# Patient Record
Sex: MALE | Race: WHITE | URBAN - METROPOLITAN AREA
[De-identification: names, ages, dates, MRNs, and addresses within clinical notes are randomized per-mention and may not be internally consistent; named-entity substitution may affect disease eponyms.]

---

## 2024-09-11 ENCOUNTER — OFFICE VISIT (OUTPATIENT)
Dept: FAMILY MEDICINE CLINIC | Facility: CLINIC | Age: 29
End: 2024-09-11

## 2024-09-11 VITALS
WEIGHT: 167.25 LBS | SYSTOLIC BLOOD PRESSURE: 131 MMHG | HEART RATE: 69 BPM | DIASTOLIC BLOOD PRESSURE: 80 MMHG | BODY MASS INDEX: 29.63 KG/M2 | HEIGHT: 63 IN

## 2024-09-11 DIAGNOSIS — R42 DIZZINESS: ICD-10-CM

## 2024-09-11 DIAGNOSIS — Z23 ENCOUNTER FOR ADMINISTRATION OF VACCINE: ICD-10-CM

## 2024-09-11 DIAGNOSIS — Z00.00 WELL ADULT EXAM: Primary | ICD-10-CM

## 2024-09-11 PROCEDURE — 99385 PREV VISIT NEW AGE 18-39: CPT | Performed by: NURSE PRACTITIONER

## 2024-09-11 PROCEDURE — 90471 IMMUNIZATION ADMIN: CPT | Performed by: NURSE PRACTITIONER

## 2024-09-11 PROCEDURE — 90715 TDAP VACCINE 7 YRS/> IM: CPT | Performed by: NURSE PRACTITIONER

## 2024-09-11 RX ORDER — MECLIZINE HYDROCHLORIDE 25 MG/1
25 TABLET ORAL 3 TIMES DAILY PRN
Qty: 30 TABLET | Refills: 0 | Status: SHIPPED | OUTPATIENT
Start: 2024-09-11

## 2024-09-11 NOTE — PROGRESS NOTES
HPI    Patient presents for annual physical.  Negative for past medical history. With concerns of intermittent dizziness for the past week or so.  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  He thinks that this may be the cause of his dizziness.  Does report that he drinks water regularly.    Diet - diet is fair.  Exercise - does not exercise regularly.  Immunizations - tdap today.     Review of Systems   Neurological:  Positive for dizziness.   All other systems reviewed and are negative.       Vitals:    09/11/24 1418   BP: 131/80   Pulse: 69   Weight: 167 lb 4 oz (75.9 kg)   Height: 5' 3\" (1.6 m)     Body mass index is 29.63 kg/m².    Health Maintenance   Topic Date Due    DTaP,Tdap,and Td Vaccines (1 - Tdap) Never done    COVID-19 Vaccine (1 - 2023-24 season) Never done    Annual Depression Screening  09/11/2025 (Originally 1/1/2024)    Influenza Vaccine (1) 10/01/2024    Annual Physical  09/11/2025    Pneumococcal Vaccine: Birth to 64yrs  Aged Out       History reviewed. No pertinent past medical history.    .History reviewed. No pertinent surgical history.    Family History   Problem Relation Age of Onset    Other (triglyceride) Father        Social History     Socioeconomic History    Marital status: Single     Spouse name: Not on file    Number of children: Not on file    Years of education: Not on file    Highest education level: Not on file   Occupational History    Not on file   Tobacco Use    Smoking status: Former     Current packs/day: 0.50     Average packs/day: 0.5 packs/day for 0.2 years (0.1 ttl pk-yrs)     Types: Cigarettes     Start date: 7/1/2024    Smokeless tobacco: Never   Vaping Use    Vaping status: Never Used   Substance and Sexual Activity    Alcohol use: Yes     Comment: social    Drug use: Never    Sexual activity: Not on file   Other Topics Concern    Not on file   Social History Narrative    Not on file     Social Determinants of Health     Financial  Resource Strain: Not on file   Food Insecurity: Not on file   Transportation Needs: Not on file   Physical Activity: Not on file   Stress: Not on file   Social Connections: Not on file   Housing Stability: Not on file       Current Outpatient Medications   Medication Sig Dispense Refill    meclizine 25 MG Oral Tab Take 1 tablet (25 mg total) by mouth 3 (three) times daily as needed. 30 tablet 0       Allergies:  No Known Allergies    Physical Exam  Vitals and nursing note reviewed.   Constitutional:       General: He is not in acute distress.     Appearance: Normal appearance. He is well-developed. He is not ill-appearing, toxic-appearing or diaphoretic.   HENT:      Head: Normocephalic and atraumatic.      Right Ear: Hearing, tympanic membrane, ear canal and external ear normal. There is no impacted cerumen.      Left Ear: Hearing, tympanic membrane, ear canal and external ear normal. There is no impacted cerumen.      Nose: Nose normal. No congestion.      Mouth/Throat:      Mouth: Mucous membranes are moist.      Pharynx: Oropharynx is clear. No oropharyngeal exudate or posterior oropharyngeal erythema.   Eyes:      General:         Right eye: No discharge.         Left eye: No discharge.      Conjunctiva/sclera: Conjunctivae normal.   Neck:      Thyroid: No thyromegaly.   Cardiovascular:      Rate and Rhythm: Normal rate and regular rhythm.      Pulses: Normal pulses.      Heart sounds: Normal heart sounds. No murmur heard.  Pulmonary:      Effort: Pulmonary effort is normal. No respiratory distress.      Breath sounds: Normal breath sounds. No stridor. No wheezing, rhonchi or rales.   Chest:      Chest wall: No tenderness.   Abdominal:      General: Abdomen is flat. Bowel sounds are normal. There is no distension.      Palpations: Abdomen is soft. There is no mass.      Tenderness: There is no abdominal tenderness. There is no guarding or rebound.      Hernia: No hernia is present.   Musculoskeletal:          General: Normal range of motion.      Cervical back: Normal range of motion and neck supple.   Lymphadenopathy:      Cervical: No cervical adenopathy.   Skin:     General: Skin is warm and dry.   Neurological:      Mental Status: He is alert and oriented to person, place, and time.   Psychiatric:         Mood and Affect: Mood normal.         Behavior: Behavior normal.         Thought Content: Thought content normal.         Judgment: Judgment normal.          Assessment and Plan:   Problem List Items Addressed This Visit    None  Visit Diagnoses       Well adult exam    -  Primary    Relevant Orders    CBC With Differential With Platelet    TSH W Reflex To Free T4    Lipid Panel    Comp Metabolic Panel (14)    TETANUS, DIPHTHERIA TOXOIDS AND ACELLULAR PERTUSIS VACCINE (TDAP), >7 YEARS, IM USE    Dizziness        Relevant Medications    meclizine 25 MG Oral Tab    Encounter for administration of vaccine        Relevant Orders    TETANUS, DIPHTHERIA TOXOIDS AND ACELLULAR PERTUSIS VACCINE (TDAP), >7 YEARS, IM USE           Follow-up for fasting labs.  Encouraged regular exercise and healthy diet.  Tetanus vaccine in the office today.  May trial meclizine as needed for dizziness, encouraged hydration.    Discussed plan of care with patient and patient is in agreement.  All questions answered. Patient to call with questions or concerns.    Encouraged to sign up for My Chart if not already registered.

## 2025-01-01 ENCOUNTER — APPOINTMENT (OUTPATIENT)
Dept: GENERAL RADIOLOGY | Age: 30
End: 2025-01-01
Attending: NURSE PRACTITIONER
Payer: COMMERCIAL

## 2025-01-01 ENCOUNTER — HOSPITAL ENCOUNTER (OUTPATIENT)
Age: 30
Discharge: HOME | End: 2025-01-01
Payer: COMMERCIAL

## 2025-01-01 VITALS
TEMPERATURE: 99 F | HEART RATE: 80 BPM | SYSTOLIC BLOOD PRESSURE: 139 MMHG | DIASTOLIC BLOOD PRESSURE: 72 MMHG | RESPIRATION RATE: 18 BRPM | OXYGEN SATURATION: 100 %

## 2025-01-01 DIAGNOSIS — D69.6 THROMBOCYTOPENIA (HCC): ICD-10-CM

## 2025-01-01 DIAGNOSIS — R42 VERTIGO: Primary | ICD-10-CM

## 2025-01-01 LAB
#MXD IC: 1.5 X10ˆ3/UL (ref 0.1–1)
BASOPHILS # BLD AUTO: 0.07 X10(3) UL (ref 0–0.2)
BASOPHILS NFR BLD AUTO: 0.7 %
BUN BLD-MCNC: 13 MG/DL (ref 7–18)
CHLORIDE BLD-SCNC: 105 MMOL/L (ref 98–112)
CO2 BLD-SCNC: 22 MMOL/L (ref 21–32)
CREAT BLD-MCNC: 0.7 MG/DL
DEPRECATED RDW RBC AUTO: 37.8 FL (ref 35.1–46.3)
EGFRCR SERPLBLD CKD-EPI 2021: 129 ML/MIN/1.73M2 (ref 60–?)
EOSINOPHIL # BLD AUTO: 0.71 X10(3) UL (ref 0–0.7)
EOSINOPHIL NFR BLD AUTO: 6.8 %
ERYTHROCYTE [DISTWIDTH] IN BLOOD BY AUTOMATED COUNT: 12 % (ref 11–15)
GLUCOSE BLD-MCNC: 149 MG/DL (ref 70–99)
HCT VFR BLD AUTO: 44.3 %
HCT VFR BLD AUTO: 44.3 %
HCT VFR BLD CALC: 47 %
HGB BLD-MCNC: 14.8 G/DL
HGB BLD-MCNC: 15.2 G/DL
IMM GRANULOCYTES # BLD AUTO: 0.04 X10(3) UL (ref 0–1)
IMM GRANULOCYTES NFR BLD: 0.4 %
ISTAT IONIZED CALCIUM FOR CHEM 8: 1.18 MMOL/L (ref 1.12–1.32)
LYMPHOCYTES # BLD AUTO: 2.6 X10ˆ3/UL (ref 1–4)
LYMPHOCYTES # BLD AUTO: 2.65 X10(3) UL (ref 1–4)
LYMPHOCYTES NFR BLD AUTO: 25.2 %
LYMPHOCYTES NFR BLD AUTO: 25.7 %
MCH RBC QN AUTO: 28.9 PG (ref 26–34)
MCH RBC QN AUTO: 29.5 PG (ref 26–34)
MCHC RBC AUTO-ENTMCNC: 33.4 G/DL (ref 31–37)
MCHC RBC AUTO-ENTMCNC: 34.3 G/DL (ref 31–37)
MCV RBC AUTO: 85.9 FL
MCV RBC AUTO: 86.5 FL (ref 80–100)
MIXED CELL %: 14.8 %
MONOCYTES # BLD AUTO: 1.39 X10(3) UL (ref 0.1–1)
MONOCYTES NFR BLD AUTO: 13.2 %
NEUTROPHILS # BLD AUTO: 5.65 X10 (3) UL (ref 1.5–7.7)
NEUTROPHILS # BLD AUTO: 5.65 X10(3) UL (ref 1.5–7.7)
NEUTROPHILS # BLD AUTO: 6.2 X10ˆ3/UL (ref 1.5–7.7)
NEUTROPHILS NFR BLD AUTO: 53.7 %
NEUTROPHILS NFR BLD AUTO: 59.5 %
PLATELET # BLD AUTO: 336 10(3)UL (ref 150–450)
POTASSIUM BLD-SCNC: 3.7 MMOL/L (ref 3.6–5.1)
RBC # BLD AUTO: 5.12 X10ˆ6/UL
RBC # BLD AUTO: 5.16 X10(6)UL
SODIUM BLD-SCNC: 140 MMOL/L (ref 136–145)
TROPONIN I BLD-MCNC: <0.02 NG/ML
WBC # BLD AUTO: 10.3 X10ˆ3/UL (ref 4–11)
WBC # BLD AUTO: 10.5 X10(3) UL (ref 4–11)

## 2025-01-01 PROCEDURE — 71046 X-RAY EXAM CHEST 2 VIEWS: CPT | Performed by: NURSE PRACTITIONER

## 2025-01-01 RX ORDER — MECLIZINE HYDROCHLORIDE 25 MG/1
25 TABLET ORAL 3 TIMES DAILY PRN
Qty: 10 TABLET | Refills: 0 | Status: SHIPPED | OUTPATIENT
Start: 2025-01-01 | End: 2025-01-08

## 2025-01-01 RX ORDER — MECLIZINE HYDROCHLORIDE 25 MG/1
25 TABLET ORAL ONCE
Status: COMPLETED | OUTPATIENT
Start: 2025-01-01 | End: 2025-01-01

## 2025-01-01 RX ORDER — SODIUM CHLORIDE 9 MG/ML
1000 INJECTION, SOLUTION INTRAVENOUS ONCE
Status: COMPLETED | OUTPATIENT
Start: 2025-01-01 | End: 2025-01-01

## 2025-01-01 NOTE — ED PROVIDER NOTES
Patient Seen in: [facility]    History   CC: dizziness  HPI: Elkin Hudson 29 year old male  who presents c/o dizziness, nausea, feeling lightheaded over the last few days intermittently.  States worsened yesterday at 2 PM.  Denies headaches, URI signs/symptoms, chest pain, difficulty breathing.  Has had nausea without vomiting.  Denies fever, rash, urinary changes/complaints.  History of similar a few weeks ago for which he was prescribed meclizine and states he took 1 tablet yesterday without significant relief.  Normal p.o. and output.  Denies tobacco use, EtOH use or illicit substance use.   ID number [id number] used for interview and exam.    History reviewed. No pertinent past medical history.    History reviewed. No pertinent surgical history.    Family History   Problem Relation Age of Onset    Other (triglyceride) Father        Social History     Socioeconomic History    Marital status: Single   Tobacco Use    Smoking status: Former     Current packs/day: 0.50     Average packs/day: 0.5 packs/day for 0.5 years (0.3 ttl pk-yrs)     Types: Cigarettes     Start date: 7/1/2024    Smokeless tobacco: Never   Vaping Use    Vaping status: Never Used   Substance and Sexual Activity    Alcohol use: Yes     Comment: social    Drug use: Never       ROS:  Systems reviewed: All pertinent positives noted in HPI. Unless otherwise noted, additional systems reviewed are negative.   Vital signs reviewed.    Positive for stated complaint: dizziness  Other systems are as noted in HPI.  Constitutional and vital signs reviewed.      All other systems reviewed and negative except as noted above.    PSFH elements reviewed from today and agreed except as otherwise stated in HPI.             Constitutional and vital signs reviewed.        Physical Exam     ED Triage Vitals [01/01/25 1542]   /71   Pulse 81   Resp 18   Temp 98.5 °F (36.9 °C)   Temp src Oral   SpO2 100 %   O2 Device None (Room  Form received at [facility] on 6/16/2023.     Due to very high form volumes, the Forms Completion team estimates forms may take longer than our usual 14 business day turnaround goal.    Authorization emailed.     air)       Current:/72   Pulse 80   Temp 98.5 °F (36.9 °C) (Oral)   Resp 18   SpO2 100%         PE:  General - Appears well, non-toxic and in NAD  Head - Appears symmetrical without deformity/swelling cranium, scalp, or facial bones  Eyes - PERRLA, sclera not injected, no discharge noted, no periorbital edema, no pain/difficulty with EOM  ENT - EAC bilaterally without discharge, TM pearly grey with COL visualized appropriately bilaterally.   No nasal drainage noted in nares bilat, no cobblestoning to post. Pharynx.   Oropharynx clear, posterior pharynx is without erythema and without tonsilar enlargement or exudate, uvula midline, +gag, voice is clear. No trismus  Neck - no significant adenopathy, supple with trachea midline  Resp - Lung sounds clear bilaterally and wob unlabored, good aeration with equal, even expansion bilaterally   CV - RRR  GI - Appears round and flat, BS +x4 quadrants, no tenderness/guarding with palpation  Skin - no rashes or petechiae noted, pink warm and dry throughout, mmm, cap refill <2seconds  Neuro - A&O x4, sensation equal to both medial and lateral aspects of extremities, steady gait  MSK - makes purposeful movements of all extremities, radial pulses 2+ bilat.  +hand grasp equal bilat  Psych - Interactive and appropriate      ED Course     Labs Reviewed   POCT CBC - Abnormal; Notable for the following components:       Result Value    # Mixed Cells 1.5 (*)     All other components within normal limits   POCT ISTAT CHEM8 CARTRIDGE - Abnormal; Notable for the following components:    ISTAT Glucose 149 (*)     All other components within normal limits   ISTAT TROPONIN - Normal   CBC W AUTO DIFF     EKG    Rate, intervals and axes as noted on EKG Report.  Rate: 89  Rhythm: Sinus Rhythm  Reading: NSR  No previous to compare           MDM     XR CHEST PA + LAT CHEST (CPT=71046)   Final Result   PROCEDURE: XR CHEST PA + LAT CHEST (CPT=71046)       COMPARISON: None.        INDICATIONS: Intermittent dizziness for 3 days.       TECHNIQUE:   Two views.         FINDINGS:    CARDIAC/VASC: Normal.  No cardiac silhouette abnormality or cardiomegaly.     Unremarkable pulmonary vasculature.     MEDIAST/QUINCY: No visible mass or adenopathy.    LUNGS/PLEURA: Normal.  No significant pulmonary parenchymal abnormalities.     No effusion or pleural thickening.     BONES: No fracture or visible bony lesion.    OTHER: Negative.                     =====   CONCLUSION:        No acute cardiopulmonary abnormality.                 Dictated by (CST): Chapin Arevalo MD on 1/01/2025 at 4:21 PM        Finalized by (CST): Chapin Arevalo MD on 1/01/2025 at 4:21 PM                   DDx: Benign positional vertigo, CVA, brain lesion, anemia, electrolyte imbalance, arrhythmia, ACS, thyroid dysfunction    Chest x-ray as noted above without acute process and independently reviewed by this provider.  Troponin negative.  Chemistry with glucose of 149 however nonfasting.  Platelets 300 preliminary, will send out to lab.  Discussed with patient likely inaccurate due to slight hemolysis of sample.  Discussed with patient need for retesting to ensure his blood counts are normal.  Saw PCP office a few months ago and had outpatient lab orders placed however patient did not obtain.  Advised to have retesting in 1 week, this will also include testing we cannot do in the immediate care here.  CBC otherwise unremarkable.  Orthostatic vitals normal.    Serial reexaminations after IV fluids provided and meclizine.  Patient states resolution of symptoms at this time.  Discussed emergent head imaging that needed however follow-up with PCP as if symptoms are persistent additional diagnostics may be warranted.  Also provided ENT for possible vestibular therapy if applicable.  Discussed hydration instructions, rest, continued meclizine and instructions/precautions on use reviewed, follow-up and return/ED precautions all reviewed.   Patient is historian and demonstrates understanding of all instruction and agrees with plan of care.   ID number [id number] used for discharge.  This case was also discussed with Dr. Medley who agrees with plan of care.      Disposition and Plan     Clinical Impression:  1. Vertigo    2. Thrombocytopenia (HCC)        Disposition:  Discharge    Follow-up:  Estefania Birmingham, [place]  [address]  [place] [address]  [phone]    Go in 3 days  As needed    Bashir Alfonso DO  [address]  [place] [address]  [phone]    Go in 1 week  As needed      Medications Prescribed:  Discharge Medication List as of 1/1/2025  4:36 PM        START taking these medications    Details   !! meclizine 25 MG Oral Tab Take 1 tablet (25 mg total) by mouth 3 (three) times daily as needed for Dizziness., Normal, Disp-10 tablet, R-0       !! - Potential duplicate medications found. Please discuss with provider.

## 2025-01-01 NOTE — DISCHARGE INSTRUCTIONS
Use the Meclizine 1-2 tablets every 8 hrs as needed for vertigo. Follow up with your PCP or ENT as provided. Have your blood work re-checked next week as already ordered by your PCP to ensure your platelet levels normalize and your other levels are normal that we cannot test in the immediate care. Seek re-evaluation in the ED for new worsening symptoms.

## 2025-01-01 NOTE — ED INITIAL ASSESSMENT (HPI)
Pt presents to the IC with c/o dizziness between 1400 yesterday and 0200 early this morning. Symptoms have been intermittent for the last 3 days, but worsened yesterday at 1400 when sitting and watching TV. Moving his head quickly makes the symptoms worse. No syncope but notes near syncope generally worse when standing. Normal PO intake. No recent ETOH use, with last intake approx 1 month ago. Denies illicit drug use. No chest pain or SOB, but notes occasional nausea.

## 2025-01-02 LAB
ATRIAL RATE: 89 BPM
P AXIS: 45 DEGREES
P-R INTERVAL: 140 MS
Q-T INTERVAL: 366 MS
QRS DURATION: 86 MS
QTC CALCULATION (BEZET): 445 MS
R AXIS: 36 DEGREES
T AXIS: 22 DEGREES
VENTRICULAR RATE: 89 BPM

## 2025-01-03 DIAGNOSIS — E05.90 HYPERTHYROIDISM: Primary | ICD-10-CM

## 2025-01-03 LAB
ABSOLUTE BASOPHILS: 74 CELLS/UL (ref 0–200)
ABSOLUTE EOSINOPHILS: 521 CELLS/UL (ref 15–500)
ABSOLUTE LYMPHOCYTES: 2539 CELLS/UL (ref 850–3900)
ABSOLUTE MONOCYTES: 1209 CELLS/UL (ref 200–950)
ABSOLUTE NEUTROPHILS: 4957 CELLS/UL (ref 1500–7800)
ALBUMIN/GLOBULIN RATIO: 1.4 (CALC) (ref 1–2.5)
ALBUMIN: 4.2 G/DL (ref 3.6–5.1)
ALKALINE PHOSPHATASE: 121 U/L (ref 36–130)
ALT: 33 U/L (ref 9–46)
AST: 24 U/L (ref 10–40)
BASOPHILS: 0.8 %
BILIRUBIN, TOTAL: 0.6 MG/DL (ref 0.2–1.2)
BUN: 11 MG/DL (ref 7–25)
CALCIUM: 9.7 MG/DL (ref 8.6–10.3)
CARBON DIOXIDE: 26 MMOL/L (ref 20–32)
CHLORIDE: 105 MMOL/L (ref 98–110)
CHOL/HDLC RATIO: 4.4 (CALC)
CHOLESTEROL, TOTAL: 136 MG/DL
CREATININE: 0.66 MG/DL (ref 0.6–1.24)
EGFR: 131 ML/MIN/1.73M2
EOSINOPHILS: 5.6 %
GLOBULIN: 3 G/DL (CALC) (ref 1.9–3.7)
GLUCOSE: 94 MG/DL (ref 65–99)
HDL CHOLESTEROL: 31 MG/DL
HEMATOCRIT: 48.7 % (ref 38.5–50)
HEMOGLOBIN: 16.2 G/DL (ref 13.2–17.1)
LDL-CHOLESTEROL: 83 MG/DL (CALC)
LYMPHOCYTES: 27.3 %
MCH: 29.7 PG (ref 27–33)
MCHC: 33.3 G/DL (ref 32–36)
MCV: 89.4 FL (ref 80–100)
MONOCYTES: 13 %
MPV: 11.9 FL (ref 7.5–12.5)
NEUTROPHILS: 53.3 %
NON-HDL CHOLESTEROL: 105 MG/DL (CALC)
PLATELET COUNT: 337 THOUSAND/UL (ref 140–400)
POTASSIUM: 4.2 MMOL/L (ref 3.5–5.3)
PROTEIN, TOTAL: 7.2 G/DL (ref 6.1–8.1)
RDW: 11.7 % (ref 11–15)
RED BLOOD CELL COUNT: 5.45 MILLION/UL (ref 4.2–5.8)
SODIUM: 138 MMOL/L (ref 135–146)
T4, FREE: 3.3 NG/DL (ref 0.8–1.8)
TRIGLYCERIDES: 122 MG/DL
TSH W/REFLEX TO FT4: 0.01 MIU/L (ref 0.4–4.5)
WHITE BLOOD CELL COUNT: 9.3 THOUSAND/UL (ref 3.8–10.8)

## 2025-02-09 NOTE — PROGRESS NOTES
HPI    Patient presents for follow up for recent diagnosis of hyperthyroidism.  He has an upcoming appointment with endocrinology on 3/13 for consult.  Patient with concerns of increased weight loss.  Has unwillingly 66-year-old loss lost 10 pounds.  With lots of episodes of dizziness.   Has had to take days off of work due to symptoms.    Review of Systems   Constitutional:  Positive for unexpected weight change.   Neurological:  Positive for dizziness.   All other systems reviewed and are negative.       Vitals:    02/09/25 0750   BP: 136/84   Pulse: 82   Resp: 14   Weight: 158 lb (71.7 kg)     Body mass index is 27.99 kg/m².    Health Maintenance   Topic Date Due    COVID-19 Vaccine (1 - 2024-25 season) Never done    Influenza Vaccine (1) Never done    Annual Depression Screening  01/01/2025    Annual Physical  09/11/2025    DTaP,Tdap,and Td Vaccines (2 - Td or Tdap) 09/11/2034    Pneumococcal Vaccine: Birth to 50yrs  Aged Out    Meningococcal B Vaccine  Aged Out       History reviewed. No pertinent past medical history.    .History reviewed. No pertinent surgical history.    Family History   Problem Relation Age of Onset    Other (triglyceride) Father        Social History     Socioeconomic History    Marital status: Single     Spouse name: Not on file    Number of children: Not on file    Years of education: Not on file    Highest education level: Not on file   Occupational History    Not on file   Tobacco Use    Smoking status: Former     Current packs/day: 0.50     Average packs/day: 0.5 packs/day for 0.6 years (0.3 ttl pk-yrs)     Types: Cigarettes     Start date: 7/1/2024    Smokeless tobacco: Never   Vaping Use    Vaping status: Never Used   Substance and Sexual Activity    Alcohol use: Yes     Comment: social    Drug use: Never    Sexual activity: Not on file   Other Topics Concern    Not on file   Social History Narrative    Not on file     Social Drivers of Health     Food Insecurity: Not on file    Transportation Needs: Not on file   Stress: Not on file   Housing Stability: Not on file       Current Outpatient Medications   Medication Sig Dispense Refill    methIMAzole 5 MG Oral Tab Take 1 tablet (5 mg total) by mouth 3 (three) times daily. 90 tablet 0       Allergies:  Allergies[1]    Physical Exam  Vitals and nursing note reviewed.   Constitutional:       General: He is not in acute distress.     Appearance: Normal appearance.   Cardiovascular:      Rate and Rhythm: Normal rate and regular rhythm.      Heart sounds: Normal heart sounds.   Pulmonary:      Effort: Pulmonary effort is normal. No respiratory distress.      Breath sounds: Normal breath sounds. No stridor. No wheezing, rhonchi or rales.   Chest:      Chest wall: No tenderness.   Neurological:      Mental Status: He is alert and oriented to person, place, and time.          Assessment and Plan:   Problem List Items Addressed This Visit    None  Visit Diagnoses       Hyperthyroidism    -  Primary    Relevant Medications    methIMAzole 5 MG Oral Tab           Start methimazole 5 mg 3 times daily.  Message sent to endocrinology to accommodate a sooner appointment.    Discussed plan of care with patient and patient is in agreement.  All questions answered. Patient to call with questions or concerns.    Encouraged to sign up for My Chart if not already registered.        [1] No Known Allergies

## 2025-02-09 NOTE — TELEPHONE ENCOUNTER
Hi montserrat we have this mutal pt who has his first appt on 03-13-25 with dr Lira. Is there any way we can get him in sooner? per providers request.  Pt has lost 10lbs in one month and is having dizzy spells

## 2025-02-10 NOTE — PROGRESS NOTES
New Patient Evaluation - History and Physical    CONSULT - Reason for Visit:    hyperthyroidism   Requesting Physician:  Estefaina Birmingham  ..No primary care provider on file.  No referring provider defined for this encounter.    CHIEF COMPLAINT:    Chief Complaint   Patient presents with    Thyroid Problem     consult      Spn int [id number]  HISTORY OF PRESENT ILLNESS:   Elkin Hudson is a 30 year old male who presents with  hyperthyroidism   Has nausea and wt loss   Labs showed hyperthyroidism in Jan 2025   2 days ago started MMI 5 mg tid     Compression symptoms: denied except the underlined ones SOB, dysphagia, odynophagia, change in voice, hoarseness, neck pain or neck mass.     The patient endorses the bolded symptoms: intolerance to cold, constipation, decreased lacrimation, fatigue; anxiety, heat intolerance, insomnia, tremors, wt loss  7 lb , wt gain,   lid lag, palpitations, proptosis, thinning hair; dyspnea, difficulty breathing when lying down, dysphagia, sensation of food getting stuck in the throat, choking sensation when lying flat, pooling of saliva, dysphonia, voice changes, hoarseness; none.      Hair and skin: no    Neck surgery: No  Neck radiation: No   Biotin: no  Turmeric:no  Family history of thyroid cancer in 1st degree relatives: no        ASSESSMENT AND PLAN:  Elkin Hudson is a 30 year old male who presents with  hyperthyroidism   Clinically and biochemically hyperthyroid   We repeated BP and it is better   Plan     Labs today   Start propranolol 20 mg twice a day   Start methimazole 10 mg twice a day   Labs and follow up in 1-2 mo   Discussed with patient possible dx, treatment options, ZAMUDIO vs surgery vs meds. Discussed thyroid    wt gain, eye disease, and SE of meds and ZAMUDIO. Methimazole may cause significant bone marrow depression; the most severe manifestation is agranulocytosis. May also cause Hepatotoxicity (including acute liver failure) Symptoms suggestive of hepatic  dysfunction (eg, anorexia, pruritus, right upper quadrant pain) should prompt evaluation. If you have nausea, vomiting, abdominal pain, jaundice- yellow skin or eye color-, dark urine, light stool color, fever, sore throat or infection, call us or the PCP.  Remission rate of ~ 40% with use of antithyroid drugs for 1-1.5 years, their side effects, monitoring, and follow-up issues, specifically agranulocytosis, liver toxicity, anaphylaxis, rash, lupus like syndrome, metallic taste in the mouth, and joint aches. We discussed that patient should discontinue methimazole at the earliest sign of a fever, sore throat, or other infection, should call our office and have WBC count with differential done. The drug should be held until the result is available.        HYPERTENSION  https://www.acpjournals.org/pb-assets/pdf/patient-info/mxu-[email or web address]-[email or web address].pdf    If blood pressure is high, monitor blood pressure at home and follow up with primary care.   Some people's blood pressure readings differ between the doctor's office and at home.     Am I at Risk?  There is no single identifiable cause of hypertension. Many factors can contribute, including:   Being overweight or obese   Eating a diet high in sodium (salt)   Not getting enough physical activity   Being older or    Smoking   Drinking too much alcohol   Having a personal or family history of hypertension   Having other chronic diseases, especially diabetes or kidney disease      PAST MEDICAL HISTORY:   History reviewed. No pertinent past medical history.  Hyperthyroidism     PAST SURGICAL HISTORY:   History reviewed. No pertinent surgical history.  no  CURRENT MEDICATIONS:     propranolol 20 MG Oral Tab Take 1 tablet (20 mg total) by mouth 2 (two) times daily. 60 tablet 2    methIMAzole 10 MG Oral Tab Take 1 tablet (10 mg total) by mouth in the morning and 1 tablet (10 mg total) before bedtime. 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     no GO     CONSTITUTIONAL:  Awake and alert. Age appropriate, good hygiene not in acute distress. Well-nourished and well developed. no acute distress   PSYCH:    Normal mood and affect,   cooperative  Neuro: speech is clear. Awake, alert, no aphasia, no facial asymmetry,    EYES:  No proptosis, no ptosis, conjunctiva normal  ENT:  Normocephalic, atraumatic  Eye: EOMI, normal lids, no discharge,    No rhinorrhea, moist oral mucosa  Neck: full range of motion  Neck/Thyroid: neck inspection: normal, No scar, No goiter   LUNGS:  No acute respiratory distress, non-labored respiration. Speaking full sentences  CARDIOVASCULAR:  regular rate   ABDOMEN:  No abdominal pain.   SKIN:  Skin is dry, no obvious rashes or lesions  EXTREMITIES: no gross abnormality   MSK: Moves extremities spontaneously. full range of motion in all major joints      DATA:    Latest Reference Range & Units 01/02/25 09:48   T4,Free (Direct) 0.8 - 1.8 ng/dL 3.3 (H)   TSH 0.40 - 4.50 mIU/L 0.01 (L)   (H): Data is abnormally high  (L):  Data is abnormally low    Pertinent data reviewed  XR CHEST PA + LAT CHEST (CPT=71046)    Result Date: 1/1/2025  CONCLUSION:   No acute cardiopulmonary abnormality.     Dictated by (CST): Chapin Arevalo MD on 1/01/2025 at 4:21 PM     Finalized by (CST): Chapin Arevalo MD on 1/01/2025 at 4:21 PM           No results for input(s): \"TSH\", \"T4F\", \"T3F\", \"THYP\" in the last 72 hours.  No results found for: \"TSH\"  No results found for: \"A1C\"        No results for input(s): \"TSH\", \"T4F\", \"T3F\", \"THYP\" in the last 72 hours.  XR CHEST PA + LAT CHEST (CPT=71046)    Result Date: 1/1/2025  CONCLUSION:   No acute cardiopulmonary abnormality.     Dictated by (CST): Chapin Arevalo MD on 1/01/2025 at 4:21 PM     Finalized by (CST): Chapin Arevalo MD on 1/01/2025 at 4:21 PM           Orders Placed This Encounter   Procedures    TSH and Free T4    Free T4, (Free Thyroxine)     Orders Placed This Encounter    TSH and Free T4     Order Specific Question:   Release to patient     Answer:   Immediate    Free T4, (Free Thyroxine)     Standing Status:   Future     Standing Expiration Date:   2/10/2026     Order Specific Question:   Release to patient     Answer:   Immediate    propranolol 20 MG Oral Tab     Sig: Take 1 tablet (20 mg total) by mouth 2 (two) times daily.     Dispense:  60 tablet     Refill:  2    methIMAzole 10 MG Oral Tab     Sig: Take 1 tablet (10 mg total) by mouth in the morning and 1 tablet (10 mg total) before bedtime.     Dispense:  60 tablet     Refill:  1          This is a specialized patient consultation in endocrinology and required comprehensive review of prior records, as well as current evaluation, with time required for consideration of complex endocrine issues and consultation. For this visit, I personally interviewed the patient, and family member if accompanied, performed the pertinent parts of the history and physical examination. ROS included screening for appropriate endocrine conditions.   Today's diagnosis and  plan were reviewed in detail with the patient who states understanding and agrees with plan. I discussed with the patient possible diagnosis, differential diagnosis, need for work up, treatment options, alternatives and side effects.     Please see note for details about time spent which includes:   · pre-visit preparation  · reviewing records  · face to face time with the patient   · timely documentation of the encounter  · ordering medications/tests  · communication with care team  · care coordination    I appreciate the opportunity to be part of your patient's medical care and will keep you, as the referring and primary physicians, informed about the care of your patient. Please feel free to contact me should you have any questions.    The 21st Century Cures Act makes medical notes like these available to patients in the interest of transparency. Please be advised this is a medical document. Medical documents are intended to carry relevant information, facts as evident, and the clinical opinion of the practitioner. The medical note is intended as peer to peer communication and may appear blunt or direct. It is written in medical language and may contain abbreviations or verbiage that are unfamiliar.   Maribeth Lira MD             [1] No Known Allergies

## 2025-02-10 NOTE — TELEPHONE ENCOUNTER
Renetta called in for the patient stating that patient can accept the appointment today at 1pm. Rn not available please follow up ph [phone]

## 2025-02-10 NOTE — PATIENT INSTRUCTIONS
Labs today   Start propranolol 20 mg twice a day   Start methimazole 10 mg twice a day   Labs and follow up in 1-2 mo   Discussed with patient possible dx, treatment options, ZAMUDIO vs surgery vs meds. Discussed thyroid   wt gain, eye disease, and SE of meds and ZAMUDIO. Methimazole may cause significant bone marrow depression; the most severe manifestation is agranulocytosis. May also cause Hepatotoxicity (including acute liver failure) Symptoms suggestive of hepatic dysfunction (eg, anorexia, pruritus, right upper quadrant pain) should prompt evaluation. If you have nausea, vomiting, abdominal pain, jaundice- yellow skin or eye color-, dark urine, light stool color, fever, sore throat or infection, call us or the PCP.  Remission rate of ~ 40% with use of antithyroid drugs for 1-1.5 years, their side effects, monitoring, and follow-up issues, specifically agranulocytosis, liver toxicity, anaphylaxis, rash, lupus like syndrome, metallic taste in the mouth, and joint aches. We discussed that patient should discontinue methimazole at the earliest sign of a fever, sore throat, or other infection, should call our office and have WBC count with differential done. The drug should be held until the result is available.         HYPERTENSION  https://www.acpjournals.org/pb-assets/pdf/patient-info/eno-nxkbczcfyevf-4736-ffiwpir-[email or web address].pdf    If blood pressure is high, monitor blood pressure at home and follow up with primary care.   Some people's blood pressure readings differ between the doctor's office and at home.     Am I at Risk?  There is no single identifiable cause of hypertension. Many factors can contribute, including:   Being overweight or obese   Eating a diet high in sodium (salt)   Not getting enough physical activity   Being older or    Smoking   Drinking too much alcohol   Having a personal or family history of hypertension   Having other chronic diseases, especially diabetes or kidney  disease

## 2025-02-10 NOTE — TELEPHONE ENCOUNTER
Dr. Lira,   See message below  Component      Latest Ref Rng 1/2/2025   TSH      0.40 - 4.50 mIU/L 0.01 (L)    T4,Free (Direct)      0.8 - 1.8 ng/dL 3.3 (H)       Please advise on earlier apt request - thanks

## 2025-02-10 NOTE — TELEPHONE ENCOUNTER
[place] citizen speaking patient returning RN's call.  Please call     See 2/9/25 closed telephone encounter

## 2025-02-13 NOTE — TELEPHONE ENCOUNTER
Please call pt with work up result   Graves antibodies are high .   Pt does not have mychart     Will discuss more next visit   Thanks

## 2025-02-20 NOTE — TELEPHONE ENCOUNTER
Patient was informed to follow up in 5 weeks. Patient informed no openings until May. Patient would like to be seen at the [facility]. Please call with  [phone],thanks

## 2025-02-20 NOTE — TELEPHONE ENCOUNTER
Patient is returning missed call, please call with  at [phone],thanks.  *see closed telephone encounter 2/13 and 2/19

## 2025-02-27 NOTE — PROGRESS NOTES
Reason for Visit:    Graves hyperthyroidism   Requesting Physician:  Estefania Birmingham  ..No primary care provider on file.  No referring provider defined for this encounter.    CHIEF COMPLAINT:    Chief Complaint   Patient presents with    Hyperthyroidism     F/u      Spn int [id number]  HISTORY OF PRESENT ILLNESS:   Elkin Hudson is a 30 year old male who presents with  hyperthyroidism  d/t Graves    He feels better energy   Labs showed hyperthyroidism in Jan 2025  TSI high     On  Propranolol 20 mg twice a day   Methimazole 10 mg x2/ a day       Hair and skin: no    Neck surgery: No  Neck radiation: No   Biotin: no  Turmeric:no  Family history of thyroid cancer in 1st degree relatives: no       Latest Reference Range & Units 01/02/25 09:48 02/10/25 14:18   T4,Free (Direct) 0.8 - 1.7 ng/dL 3.3 (H) 2.6 (H)   TSH 0.550 - 4.780 uIU/mL 0.01 (L) <0.010 (L)      Latest Reference Range & Units 02/10/25 14:18   Thy Stim Immuno 0.00 - 0.55 IU/L 14.20 (H)      ASSESSMENT AND PLAN:  Elkin Hudson is a 30 year old male who presents with  hyperthyroidism d/t Graves  Clinically and biochemically hyperthyroid      Plan       Labs and follow up in 1 mo   propranolol 20 mg twice a day   methimazole 10 mg x3/ a day      Methimazole may cause significant bone marrow depression; the most severe manifestation is agranulocytosis. May also cause Hepatotoxicity (including acute liver failure) Symptoms suggestive of hepatic dysfunction (eg, anorexia, pruritus, right upper quadrant pain) should prompt evaluation. If you have nausea, vomiting, abdominal pain, jaundice- yellow skin or eye color-, dark urine, light stool color, fever, sore throat or infection, call us or the PCP.  Remission rate of ~ 40% with use of antithyroid drugs for 1-1.5 years, their side effects, monitoring, and follow-up issues, specifically agranulocytosis, liver toxicity, anaphylaxis, rash, lupus like syndrome, metallic taste in the mouth, and joint aches.  We discussed that patient should discontinue methimazole at the earliest sign of a fever, sore throat, or other infection, should call our office and have WBC count with differential done. The drug should be held until the result is available.        HYPERTENSION  https://www.acpjournals.org/pb-assets/pdf/patient-info/wqr-yyjbhtlnyzod-6813-mdufuwh-[email or web address].pdf    If blood pressure is high, monitor blood pressure at home and follow up with primary care.   Some people's blood pressure readings differ between the doctor's office and at home.     Am I at Risk?  There is no single identifiable cause of hypertension. Many factors can contribute, including:   Being overweight or obese   Eating a diet high in sodium (salt)   Not getting enough physical activity   Being older or    Smoking   Drinking too much alcohol   Having a personal or family history of hypertension   Having other chronic diseases, especially diabetes or kidney disease      PAST MEDICAL HISTORY:   History reviewed. No pertinent past medical history.  Hyperthyroidism     PAST SURGICAL HISTORY:   History reviewed. No pertinent surgical history.  no  CURRENT MEDICATIONS:     propranolol 20 MG Oral Tab Take 1 tablet (20 mg total) by mouth 2 (two) times daily. 60 tablet 2    methIMAzole 10 MG Oral Tab Take 1 tablet (10 mg total) by mouth 3 (three) times daily. 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   no GO      DATA:    Latest Reference Range & Units 01/02/25 09:48 02/10/25 14:18   T4,Free (Direct) 0.8 - 1.7 ng/dL 3.3 (H) 2.6 (H)   TSH 0.550 - 4.780 uIU/mL 0.01 (L) <0.010 (L)      Latest Reference Range & Units 02/10/25 14:18   Thy Stim Immuno 0.00 - 0.55 IU/L 14.20 (H)   (H): Data is abnormally high    Pertinent data reviewed  XR CHEST PA + LAT CHEST (CPT=71046)    Result Date: 1/1/2025  CONCLUSION:   No acute cardiopulmonary abnormality.     Dictated by (CST): Chapin Arevalo MD on 1/01/2025 at 4:21 PM     Finalized by (CST): Chapin Arevalo MD on 1/01/2025 at 4:21 PM           No results for input(s): \"TSH\", \"T4F\", \"T3F\", \"THYP\" in the last 72 hours.  TSH   Date Value Ref Range Status   02/10/2025 <0.010 (L) 0.550 - 4.780 uIU/mL Final     No results found for: \"A1C\"        No results for input(s): \"TSH\", \"T4F\", \"T3F\", \"THYP\" in the last 72 hours.  XR CHEST PA + LAT CHEST (CPT=71046)    Result Date: 1/1/2025  CONCLUSION:   No acute cardiopulmonary abnormality.     Dictated by (CST): Chapin Arevalo MD on 1/01/2025 at 4:21 PM     Finalized by (CST): Chapin Arevalo MD on 1/01/2025 at 4:21 PM           Orders Placed This Encounter   Procedures    Free T4, (Free Thyroxine)     Orders Placed This Encounter    Free T4, (Free Thyroxine)     Order Specific Question:   Release to patient     Answer:   Immediate    propranolol 20 MG Oral Tab     Sig: Take 1 tablet (20 mg total) by mouth 2 (two) times daily.     Dispense:  60 tablet     Refill:  2    methIMAzole 10 MG Oral Tab     Sig: Take 1 tablet (10 mg total) by mouth 3 (three) times daily.     Dispense:  90 tablet     Refill:  2          This is a  specialized patient consultation in endocrinology and required comprehensive review of prior records, as well as current evaluation, with time required for consideration of complex endocrine issues and consultation. For this visit, I personally interviewed the patient, and family member if accompanied, performed the pertinent parts of the history and physical examination. ROS included screening for appropriate endocrine conditions.   Today's diagnosis and plan were reviewed in detail with the patient who states understanding and agrees with plan. I discussed with the patient possible diagnosis, differential diagnosis, need for work up, treatment options, alternatives and side effects.     Please see note for details about time spent which includes:   · pre-visit preparation  · reviewing records  · face to face time with the patient   · timely documentation of the encounter  · ordering medications/tests  · communication with care team  · care coordination    I appreciate the opportunity to be part of your patient's medical care and will keep you, as the referring and primary physicians, informed about the care of your patient. Please feel free to contact me should you have any questions.    The 21st Century Cures Act makes medical notes like these available to patients in the interest of transparency. Please be advised this is a medical document. Medical documents are intended to carry relevant information, facts as evident, and the clinical opinion of the practitioner. The medical note is intended as peer to peer communication and may appear blunt or direct. It is written in medical language and may contain abbreviations or verbiage that are unfamiliar.   Maribeth Lira MD             [1] No Known Allergies

## 2025-04-09 NOTE — TELEPHONE ENCOUNTER
Patient calling states needs refill request on both medications. States is out. Please call.     (Propranolol and methimazole)

## 2025-04-09 NOTE — TELEPHONE ENCOUNTER
Per LOV 2/27  Labs and follow up in 1 month   Propranolol 20 mg BID  MMI 10 mg TID    Called pharmacy and confirmed that pt has refills for both scripts. Requested that both scripts be processed. Patient will be notified once ready for . Patient notified.   Also advised pt to do labs prior to appt.

## 2025-04-15 NOTE — PROGRESS NOTES
Reason for Visit:    Graves hyperthyroidism   Requesting Physician:  Estefania Birmingham  ..No primary care provider on file.  No referring provider defined for this encounter.    CHIEF COMPLAINT:    Chief Complaint   Patient presents with    Hyperthyroidism     F/u      Spn int [id number]  HISTORY OF PRESENT ILLNESS:   Elkin Hudson is a 30 year old male who presents with  hyperthyroidism  d/t Graves  Labs showed hyperthyroidism in Jan 2025  TSI high   Gained 8 lb   Feels stronger but sometimes feels fatigue and dizzy  Not working out  No tremors   Sometimes gets palpations   On  Propranolol 20 mg twice a day   Methimazole 20/10 mg x2/ a day       Hair and skin: no    Neck surgery: No  Neck radiation: No   Biotin: no  Turmeric:no  Family history of thyroid cancer in 1st degree relatives: no              02/10/25 14:18   Thy Stim Immuno  14.20 (H)      ASSESSMENT AND PLAN:  Elkin Hudson is a 30 year old male who presents with  hyperthyroidism d/t Graves  Clinically better   Will do labs today     Plan       Labs today   Labs and follow up in 1 -2mo   propranolol 20 mg twice a day   methimazole 10 mg x3/ a day      Methimazole may cause significant bone marrow depression; the most severe manifestation is agranulocytosis. May also cause Hepatotoxicity (including acute liver failure) Symptoms suggestive of hepatic dysfunction (eg, anorexia, pruritus, right upper quadrant pain) should prompt evaluation. If you have nausea, vomiting, abdominal pain, jaundice- yellow skin or eye color-, dark urine, light stool color, fever, sore throat or infection, call us or the PCP.  Remission rate of ~ 40% with use of antithyroid drugs for 1-1.5 years, their side effects, monitoring, and follow-up issues, specifically agranulocytosis, liver toxicity, anaphylaxis, rash, lupus like syndrome, metallic taste in the mouth, and joint aches. We discussed that patient should discontinue methimazole at the earliest sign of a fever, sore  throat, or other infection, should call our office and have WBC count with differential done. The drug should be held until the result is available.         PAST MEDICAL HISTORY:   History reviewed. No pertinent past medical history.  Hyperthyroidism   graves  PAST SURGICAL HISTORY:   History reviewed. No pertinent surgical history.  no  CURRENT MEDICATIONS:     propranolol 20 MG Oral Tab Take 1 tablet (20 mg total) by mouth 2 (two) times daily. 60 tablet 2    methIMAzole 10 MG Oral Tab Take 1 tablet (10 mg total) by mouth 3 (three) times daily. 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  Wt Readings from Last 6 Encounters:   04/15/25 167 lb (75.8 kg)   02/27/25 159 lb (72.1 kg)   02/10/25 159 lb (72.1 kg)   02/09/25 158 lb (71.7 kg)   09/11/24 167 lb 4 oz (75.9 kg)        no GO      DATA:    Latest Reference Range & Units 01/02/25 09:48 02/10/25 14:18   T4,Free (Direct) 0.8 - 1.7 ng/dL 3.3 (H) 2.6 (H)   TSH 0.550 - 4.780 uIU/mL 0.01 (L) <0.010 (L)      Latest Reference Range & Units 02/10/25 14:18   Thy Stim Immuno 0.00 - 0.55 IU/L 14.20 (H)   (H): Data is  abnormally high    Pertinent data reviewed  No results found.    No results for input(s): \"TSH\", \"T4F\", \"T3F\", \"THYP\" in the last 72 hours.  TSH   Date Value Ref Range Status   02/10/2025 <0.010 (L) 0.550 - 4.780 uIU/mL Final     No results found for: \"A1C\"        No results for input(s): \"TSH\", \"T4F\", \"T3F\", \"THYP\" in the last 72 hours.  No results found.    Orders Placed This Encounter   Procedures    Free T4, (Free Thyroxine)    Free T4, (Free Thyroxine)     Orders Placed This Encounter    Free T4, (Free Thyroxine)     Release to patient:   Immediate    Free T4, (Free Thyroxine)     Standing Status:   Future     Number of Occurrences:   1     Expected Date:   5/15/2025     Expiration Date:   4/15/2026     Release to patient:   Immediate    propranolol 20 MG Oral Tab     Sig: Take 1 tablet (20 mg total) by mouth 2 (two) times daily.     Dispense:  60 tablet     Refill:  2    methIMAzole 10 MG Oral Tab     Sig: Take 1 tablet (10 mg total) by mouth 3 (three) times daily.     Dispense:  90 tablet     Refill:  2          This is a specialized patient consultation in endocrinology and required comprehensive review of prior records, as well as current evaluation, with time required for consideration of complex endocrine issues and consultation. For this visit, I personally interviewed the patient, and family member if accompanied, performed the pertinent parts of the history and physical examination. ROS included screening for appropriate endocrine conditions.   Today's diagnosis and plan were reviewed in detail with the patient who states understanding and agrees with plan. I discussed with the patient possible diagnosis, differential diagnosis, need for work up, treatment options, alternatives and side effects.     Please see note for details about time spent which includes:   · pre-visit preparation  · reviewing records  · face to face time with the patient   · timely documentation of the encounter  · ordering  medications/tests  · communication with care team  · care coordination    I appreciate the opportunity to be part of your patient's medical care and will keep you, as the referring and primary physicians, informed about the care of your patient. Please feel free to contact me should you have any questions.    The 21st Century Cures Act makes medical notes like these available to patients in the interest of transparency. Please be advised this is a medical document. Medical documents are intended to carry relevant information, facts as evident, and the clinical opinion of the practitioner. The medical note is intended as peer to peer communication and may appear blunt or direct. It is written in medical language and may contain abbreviations or verbiage that are unfamiliar.   Maribeth Lira MD             [1] No Known Allergies

## 2025-04-15 NOTE — PATIENT INSTRUCTIONS
Labs today   Labs and follow up in 1 mo   propranolol 20 mg twice a day   methimazole 10 mg x3/ a day      Methimazole may cause significant bone marrow depression; the most severe manifestation is agranulocytosis. May also cause Hepatotoxicity (including acute liver failure) Symptoms suggestive of hepatic dysfunction (eg, anorexia, pruritus, right upper quadrant pain) should prompt evaluation. If you have nausea, vomiting, abdominal pain, jaundice- yellow skin or eye color-, dark urine, light stool color, fever, sore throat or infection, call us or the PCP.  Remission rate of ~ 40% with use of antithyroid drugs for 1-1.5 years, their side effects, monitoring, and follow-up issues, specifically agranulocytosis, liver toxicity, anaphylaxis, rash, lupus like syndrome, metallic taste in the mouth, and joint aches. We discussed that patient should discontinue methimazole at the earliest sign of a fever, sore throat, or other infection, should call our office and have WBC count with differential done. The drug should be held until the result is available.

## 2025-06-05 NOTE — TELEPHONE ENCOUNTER
Patient states that he has run out of the Propranolol and Methimazole medications. Patient is not sure if he should continue to take both medications, if yes, then he will need to get a refill sent to the [facility] in [place].         Current Outpatient Medications   Medication Sig Dispense Refill    propranolol 20 MG Oral Tab Take 1 tablet (20 mg total) by mouth 2 (two) times daily. 60 tablet 2    methIMAzole 10 MG Oral Tab Take 1 tablet (10 mg total) by mouth 3 (three) times daily. 90 tablet 2

## 2025-06-05 NOTE — TELEPHONE ENCOUNTER
Please schedule an appt soon or add to waiting list   Thanks     Last completed office visit: 4/15/2025 Maribeth Lira MD   Next scheduled Follow up: No future appointments.

## 2025-06-05 NOTE — TELEPHONE ENCOUNTER
Endocrine Refill protocol for oral antihypertensive medications    Protocol Criteria:  PASSED       If all below requirements are met, send a 90-day supply with 1 refill per provider protocol.    Verify appointment with Endocrinology completed in the last 6 months or scheduled in the next 3 months.  Verify BMP or CMP completed in the last 12 months   Verify last GFR result is greater than or equal to 50     Last completed office visit:4/15/2025 Maribeth Lira MD     Last BMP or CMP completion date:  Lab Results   Component Value Date    EGFRCR 131 01/02/2025   ---  Endocrine refill protocol for medications for hypothyroidism and hyperthyroidism    Protocol Criteria:  FAILED Reason: Abnormal labs    If all below requirements are met, send a 90-day supply with 1 refill per provider protocol.    Verify appointment with Endocrinology completed in the last 12 months or scheduled in the next 6 months.    Normal TSH result in the past 12 months   Review recent telephone encounters and [facility] communications with patient to ensure a dose change has not occurred since last office visit that was not updated in the medication history list     Last completed office visit:4/15/2025 Maribeth Lira MD     Next scheduled Follow up: No future appointments. - Jia[facility]hart message to schedule appointment and to have labs drawn     Last TSH result:   TSH   Date Value Ref Range Status   02/10/2025 <0.010 (L) 0.550 - 4.780 uIU/mL Final